# Patient Record
Sex: MALE | Race: WHITE | NOT HISPANIC OR LATINO | Employment: UNEMPLOYED | URBAN - METROPOLITAN AREA
[De-identification: names, ages, dates, MRNs, and addresses within clinical notes are randomized per-mention and may not be internally consistent; named-entity substitution may affect disease eponyms.]

---

## 2021-09-13 ENCOUNTER — APPOINTMENT (OUTPATIENT)
Dept: GENERAL RADIOLOGY | Facility: CLINIC | Age: 56
End: 2021-09-13
Attending: EMERGENCY MEDICINE
Payer: MEDICAID

## 2021-09-13 ENCOUNTER — HOSPITAL ENCOUNTER (EMERGENCY)
Facility: CLINIC | Age: 56
Discharge: HOME | End: 2021-09-13
Attending: EMERGENCY MEDICINE | Admitting: EMERGENCY MEDICINE
Payer: MEDICAID

## 2021-09-13 VITALS
BODY MASS INDEX: 40.09 KG/M2 | WEIGHT: 280 LBS | SYSTOLIC BLOOD PRESSURE: 119 MMHG | HEART RATE: 72 BPM | RESPIRATION RATE: 16 BRPM | DIASTOLIC BLOOD PRESSURE: 90 MMHG | TEMPERATURE: 97.1 F | OXYGEN SATURATION: 93 % | HEIGHT: 70 IN

## 2021-09-13 DIAGNOSIS — S92.354A NONDISPLACED FRACTURE OF FIFTH METATARSAL BONE, RIGHT FOOT, INITIAL ENCOUNTER FOR CLOSED FRACTURE: ICD-10-CM

## 2021-09-13 LAB
ALBUMIN SERPL-MCNC: 3.4 G/DL (ref 3.4–5)
ALP SERPL-CCNC: 151 U/L (ref 40–150)
ALT SERPL W P-5'-P-CCNC: 25 U/L (ref 0–70)
ANION GAP SERPL CALCULATED.3IONS-SCNC: 1 MMOL/L (ref 3–14)
AST SERPL W P-5'-P-CCNC: 12 U/L (ref 0–45)
BASOPHILS # BLD AUTO: 0 10E3/UL (ref 0–0.2)
BASOPHILS NFR BLD AUTO: 1 %
BILIRUB SERPL-MCNC: 0.2 MG/DL (ref 0.2–1.3)
BUN SERPL-MCNC: 28 MG/DL (ref 7–30)
CALCIUM SERPL-MCNC: 8.7 MG/DL (ref 8.5–10.1)
CHLORIDE BLD-SCNC: 107 MMOL/L (ref 94–109)
CO2 SERPL-SCNC: 31 MMOL/L (ref 20–32)
CREAT SERPL-MCNC: 1.31 MG/DL (ref 0.66–1.25)
D DIMER PPP FEU-MCNC: 0.34 UG/ML FEU (ref 0–0.5)
EOSINOPHIL # BLD AUTO: 0.2 10E3/UL (ref 0–0.7)
EOSINOPHIL NFR BLD AUTO: 2 %
ERYTHROCYTE [DISTWIDTH] IN BLOOD BY AUTOMATED COUNT: 16.9 % (ref 10–15)
GFR SERPL CREATININE-BSD FRML MDRD: 60 ML/MIN/1.73M2
GLUCOSE BLD-MCNC: 107 MG/DL (ref 70–99)
HCT VFR BLD AUTO: 36.5 % (ref 40–53)
HGB BLD-MCNC: 10.7 G/DL (ref 13.3–17.7)
HOLD SPECIMEN: NORMAL
IMM GRANULOCYTES # BLD: 0 10E3/UL
IMM GRANULOCYTES NFR BLD: 0 %
LYMPHOCYTES # BLD AUTO: 1.4 10E3/UL (ref 0.8–5.3)
LYMPHOCYTES NFR BLD AUTO: 17 %
MCH RBC QN AUTO: 23.6 PG (ref 26.5–33)
MCHC RBC AUTO-ENTMCNC: 29.3 G/DL (ref 31.5–36.5)
MCV RBC AUTO: 80 FL (ref 78–100)
MONOCYTES # BLD AUTO: 0.7 10E3/UL (ref 0–1.3)
MONOCYTES NFR BLD AUTO: 9 %
NEUTROPHILS # BLD AUTO: 5.7 10E3/UL (ref 1.6–8.3)
NEUTROPHILS NFR BLD AUTO: 71 %
NRBC # BLD AUTO: 0 10E3/UL
NRBC BLD AUTO-RTO: 0 /100
NT-PROBNP SERPL-MCNC: 2679 PG/ML (ref 0–900)
PLATELET # BLD AUTO: 323 10E3/UL (ref 150–450)
POTASSIUM BLD-SCNC: 4.4 MMOL/L (ref 3.4–5.3)
PROT SERPL-MCNC: 7.7 G/DL (ref 6.8–8.8)
RBC # BLD AUTO: 4.54 10E6/UL (ref 4.4–5.9)
SODIUM SERPL-SCNC: 139 MMOL/L (ref 133–144)
TROPONIN I SERPL-MCNC: <0.015 UG/L (ref 0–0.04)
WBC # BLD AUTO: 8 10E3/UL (ref 4–11)

## 2021-09-13 PROCEDURE — 83880 ASSAY OF NATRIURETIC PEPTIDE: CPT | Performed by: EMERGENCY MEDICINE

## 2021-09-13 PROCEDURE — 99284 EMERGENCY DEPT VISIT MOD MDM: CPT | Mod: 25

## 2021-09-13 PROCEDURE — 85379 FIBRIN DEGRADATION QUANT: CPT | Performed by: EMERGENCY MEDICINE

## 2021-09-13 PROCEDURE — 82040 ASSAY OF SERUM ALBUMIN: CPT | Performed by: EMERGENCY MEDICINE

## 2021-09-13 PROCEDURE — 36415 COLL VENOUS BLD VENIPUNCTURE: CPT | Performed by: EMERGENCY MEDICINE

## 2021-09-13 PROCEDURE — 84484 ASSAY OF TROPONIN QUANT: CPT | Performed by: EMERGENCY MEDICINE

## 2021-09-13 PROCEDURE — 73630 X-RAY EXAM OF FOOT: CPT | Mod: RT

## 2021-09-13 PROCEDURE — 80053 COMPREHEN METABOLIC PANEL: CPT | Performed by: EMERGENCY MEDICINE

## 2021-09-13 PROCEDURE — 71045 X-RAY EXAM CHEST 1 VIEW: CPT

## 2021-09-13 PROCEDURE — 250N000013 HC RX MED GY IP 250 OP 250 PS 637: Performed by: EMERGENCY MEDICINE

## 2021-09-13 PROCEDURE — 85025 COMPLETE CBC W/AUTO DIFF WBC: CPT | Performed by: EMERGENCY MEDICINE

## 2021-09-13 RX ORDER — HYDROCODONE BITARTRATE AND ACETAMINOPHEN 5; 325 MG/1; MG/1
1 TABLET ORAL EVERY 6 HOURS PRN
Qty: 10 TABLET | Refills: 0 | Status: SHIPPED | OUTPATIENT
Start: 2021-09-13 | End: 2021-09-16

## 2021-09-13 RX ORDER — HYDROCODONE BITARTRATE AND ACETAMINOPHEN 5; 325 MG/1; MG/1
1 TABLET ORAL ONCE
Status: COMPLETED | OUTPATIENT
Start: 2021-09-13 | End: 2021-09-13

## 2021-09-13 RX ADMIN — HYDROCODONE BITARTRATE AND ACETAMINOPHEN 1 TABLET: 5; 325 TABLET ORAL at 16:15

## 2021-09-13 ASSESSMENT — ENCOUNTER SYMPTOMS: ARTHRALGIAS: 1

## 2021-09-13 ASSESSMENT — MIFFLIN-ST. JEOR: SCORE: 2101.32

## 2021-09-13 NOTE — ED TRIAGE NOTES
"Pt c/o right foot pain and swelling since Friday. Pt states he is diabetic, then he states his insurance got screwed up and [facility] \"dropped\" him. But he had blood drawn prior to the appointment and got a letter in the mail saying he is diabetic- not on meds, no glucose checks.   "

## 2021-09-13 NOTE — DISCHARGE INSTRUCTIONS
Wear boot when up and walking.  Use ice and elevation use medication for pain when needed.  Follow-up with orthopedics for recheck on your fifth metatarsal fracture.  We have seen a slight increase in your weight and swelling consider adding a pill of the diuretic for a few days.  Return with increasing shortness of breath chest pain or worsening condition.

## 2021-09-13 NOTE — ED PROVIDER NOTES
"  History   Chief Complaint:  Foot Pain    The history is provided by the patient.      Topher Ricci is a 55 year old male with history of hypertension, CHF, and type two diabetes mellitus who presents with foot pain. Patient developed right foot pain and swelling four days ago which has been worsening since onset. He complains of most pain of the top and lateral aspect of his foot. Last night he stepped on something in his garden which worsened the pain. Patient has bilateral leg swelling but his right leg is more swollen.     Review of Systems   Cardiovascular: Positive for leg swelling.   Musculoskeletal: Positive for arthralgias (right foot).   All other systems reviewed and are negative.    Allergies:  The patient has no known allergies.     Medications:  Lipitor  Wellbutrin  Prinivil  Toprol xl  Protonix  Demadex    Past Medical History:    Acute kidney injury  Acute GI bleeding  Carpal tunnel syndrome  Acute blood loss anemia  Type two diabetes mellitus  Hyperkalemia  Metabolic acidosis  GERD  Hyponatremia  CKD  Obesity  CHF  Nonischemic cardiomyopathy  Left bundle branch block  Thoracic spine fracture  Hypertension  Rosacea  Leukocytosis  Rib fractures    Past Surgical History:    Leg surgery  Abdomen peritoneum omentum surgery  Q placement for right rib fracture    Family History:    Father: hypertension  Mother: cancer    Social History:  Presents to ED alone    Physical Exam     Patient Vitals for the past 24 hrs:   BP Temp Temp src Pulse Resp SpO2 Height Weight   09/13/21 1617 -- -- -- -- -- 98 % -- --   09/13/21 1615 108/73 -- -- 73 -- 100 % -- --   09/13/21 1614 -- -- -- -- -- 98 % -- --   09/13/21 1218 (!) 148/107 97.1  F (36.2  C) Oral 91 20 97 % 1.778 m (5' 10\") 127 kg (280 lb)       Physical Exam  Vitals reviewed.   Constitutional:       Appearance: He is obese.   HENT:      Right Ear: Tympanic membrane normal.      Left Ear: Tympanic membrane normal.   Cardiovascular:      Rate and Rhythm: " Normal rate.   Pulmonary:      Comments: Decreased breath sounds bilateral bases  Abdominal:      General: Abdomen is flat.      Palpations: Abdomen is soft.   Musculoskeletal:      Comments: Patient is +2 edema bilateral lower extremities.  She is tender over the lateral aspect of the right foot in the region of the fifth metatarsal.  DP and PT pulse symmetric to the left.   Skin:     General: Skin is warm.      Capillary Refill: Capillary refill takes less than 2 seconds.   Neurological:      General: No focal deficit present.      Mental Status: He is alert.   Psychiatric:         Mood and Affect: Mood normal.           Emergency Department Course   Imaging:  Foot XR, G/E 3 views, right  Fifth metatarsal transverse nondisplaced fracture or completed stress fracture distal to the intermetatarsal ligaments. Fractures in this location can be associated with a higher incidence of delayed union.  As per radiology.     XR Chest 1 View  Similar to prior. No new consolidation. No pleural effusions or pneumothorax. Unchanged cardiac size. Remote right-sided rib fractures.  As per radiology.     Laboratory:  Ddimer: 0.34    CMP: Anion Gap 1(L), Creatinine 1.31(H), Glucose 107(H), Alkaline Phosphatase 151(H), GFR 60(L), o/w WNL     Troponin (Collected 1604): <0.015    Nt probnp inpatient (BNP): 2679(H)    CBC: WBC 8.0, HGB 10.7(L),      Emergency Department Course:    Reviewed:  I reviewed nursing notes, vitals, past medical history and care everywhere    Assessments:  1544 I obtained history and examined the patient as noted above.   1717 I rechecked the patient and explained findings.     Interventions:  1615 Norco, 1 tablet, PO    Disposition:  The patient was discharged to home.       Impression & Plan     Medical Decision Making:  Patient presents with right foot pain.  Patient is states he is in been dropped by [employer] insurance.  Does seem slightly tachypneic and edematous patient has a history of congestive  heart failure x-rays of the foot do identify a nondisplaced fifth metatarsal fracture cannot rule out trauma versus not stress related as patient does not give a history of trauma but based on the notes has been an alcoholic.  Ultimately did offer the patient admission as he seems slightly edematous and N-terminal BNP is over 2000 versus her concerns for congestive heart failure patient prefers to go home patient states he has his diuretics and review of the chart patient's been seen at the emergency room and as an outpatient and is refill these 3 recently for now will ask him to increase his diuretics at least by 1 pill a day I offered him a cam boot medication for pain and follow-up with orthopedics.    Covid-19  Topher Ricci was evaluated during a global COVID-19 pandemic, which necessitated consideration that the patient might be at risk for infection with the SARS-CoV-2 virus that causes COVID-19.   Applicable protocols for evaluation were followed during the patient's care.     Diagnosis:    ICD-10-CM    1. Nondisplaced fracture of fifth metatarsal bone, right foot, initial encounter for closed fracture  S92.354A        Discharge Medications:  New Prescriptions    HYDROCODONE-ACETAMINOPHEN (NORCO) 5-325 MG TABLET    Take 1 tablet by mouth every 6 hours as needed for moderate to severe pain       Scribe Disclosure:  I, Huang Dhillon, am serving as a scribe at 3:42 PM on 9/13/2021 to document services personally performed by Juan Wilcox MD based on my observations and the provider's statements to me.            Juan Wilcox MD  09/16/21 [id number]